# Patient Record
Sex: FEMALE | Race: BLACK OR AFRICAN AMERICAN | URBAN - METROPOLITAN AREA
[De-identification: names, ages, dates, MRNs, and addresses within clinical notes are randomized per-mention and may not be internally consistent; named-entity substitution may affect disease eponyms.]

---

## 2023-02-12 ENCOUNTER — OFFICE (OUTPATIENT)
Dept: URBAN - METROPOLITAN AREA CLINIC 90 | Facility: CLINIC | Age: 58
Setting detail: OPHTHALMOLOGY
End: 2023-02-12
Payer: COMMERCIAL

## 2023-02-12 DIAGNOSIS — H43.392: ICD-10-CM

## 2023-02-12 DIAGNOSIS — H40.013: ICD-10-CM

## 2023-02-12 DIAGNOSIS — H53.022: ICD-10-CM

## 2023-02-12 DIAGNOSIS — H25.13: ICD-10-CM

## 2023-02-12 DIAGNOSIS — H52.7: ICD-10-CM

## 2023-02-12 PROCEDURE — 92250 FUNDUS PHOTOGRAPHY W/I&R: CPT | Performed by: OPHTHALMOLOGY

## 2023-02-12 PROCEDURE — 92014 COMPRE OPH EXAM EST PT 1/>: CPT | Performed by: OPHTHALMOLOGY

## 2023-02-12 ASSESSMENT — CONFRONTATIONAL VISUAL FIELD TEST (CVF)
OD_FINDINGS: FULL
OS_FINDINGS: FULL

## 2023-02-12 ASSESSMENT — REFRACTION_MANIFEST
OD_CYLINDER: SPH
OD_VA1: 20/20
OD_SPHERE: +2.50
OD_SPHERE: +0.75
OD_VA2: 20/25(J1)
OD_VA1: 20/20
OS_SPHERE: +2.50
OD_ADD: +2.25
OD_VA2: 20/25(J1)

## 2023-02-12 ASSESSMENT — REFRACTION_AUTOREFRACTION
OD_CYLINDER: SPH
OS_AXIS: 042
OS_CYLINDER: -1.50
OD_SPHERE: +1.25
OS_SPHERE: -11.50

## 2023-02-12 ASSESSMENT — REFRACTION_CURRENTRX
OD_SPHERE: +2.00
OS_SPHERE: +2.00
OS_CYLINDER: SPH
OS_VPRISM_DIRECTION: SV
OD_SPHERE: +2.00
OS_OVR_VA: 20/
OD_VPRISM_DIRECTION: SV
OS_SPHERE: +2.00
OD_CYLINDER: SPH
OS_OVR_VA: 20/
OD_OVR_VA: 20/
OD_OVR_VA: 20/

## 2023-02-12 ASSESSMENT — VISUAL ACUITY
OD_BCVA: 20/400
OS_BCVA: 20/20-1

## 2023-02-12 ASSESSMENT — TONOMETRY
OS_IOP_MMHG: 14
OD_IOP_MMHG: 14

## 2023-02-12 ASSESSMENT — KERATOMETRY
OD_AXISANGLE_DEGREES: 088
OD_K1POWER_DIOPTERS: 42.25
OS_K2POWER_DIOPTERS: 43.25
OS_K1POWER_DIOPTERS: 42.50
OD_K2POWER_DIOPTERS: 42.50
OS_AXISANGLE_DEGREES: 114

## 2023-02-12 ASSESSMENT — SPHEQUIV_DERIVED: OS_SPHEQUIV: -12.25

## 2023-02-12 ASSESSMENT — AXIALLENGTH_DERIVED: OS_AL: 29.94

## 2024-01-02 ENCOUNTER — OFFICE (OUTPATIENT)
Dept: URBAN - METROPOLITAN AREA CLINIC 90 | Facility: CLINIC | Age: 59
Setting detail: OPHTHALMOLOGY
End: 2024-01-02
Payer: COMMERCIAL

## 2024-01-02 DIAGNOSIS — H43.811: ICD-10-CM

## 2024-01-02 DIAGNOSIS — H53.022: ICD-10-CM

## 2024-01-02 DIAGNOSIS — H40.013: ICD-10-CM

## 2024-01-02 DIAGNOSIS — H25.13: ICD-10-CM

## 2024-01-02 PROCEDURE — 92014 COMPRE OPH EXAM EST PT 1/>: CPT | Performed by: OPHTHALMOLOGY

## 2024-01-02 ASSESSMENT — REFRACTION_MANIFEST
OD_VA2: 20/25(J1)
OD_CYLINDER: SPH
OD_ADD: +2.25
OD_VA1: 20/20
OD_VA2: 20/25(J1)
OS_SPHERE: +2.50
OD_SPHERE: +0.75
OD_SPHERE: +2.50
OD_VA1: 20/20

## 2024-01-02 ASSESSMENT — REFRACTION_AUTOREFRACTION
OS_AXIS: 042
OS_CYLINDER: -1.50
OD_SPHERE: +1.25
OD_CYLINDER: SPH
OS_SPHERE: -11.50

## 2024-01-02 ASSESSMENT — REFRACTION_CURRENTRX
OD_SPHERE: +2.00
OD_CYLINDER: SPH
OS_OVR_VA: 20/
OD_OVR_VA: 20/
OD_VPRISM_DIRECTION: SV
OD_SPHERE: +2.00
OS_OVR_VA: 20/
OS_VPRISM_DIRECTION: SV
OS_SPHERE: +2.00
OD_OVR_VA: 20/
OS_CYLINDER: SPH
OS_SPHERE: +2.00

## 2024-01-02 ASSESSMENT — CONFRONTATIONAL VISUAL FIELD TEST (CVF)
OS_FINDINGS: FULL
OD_FINDINGS: FULL

## 2024-01-02 ASSESSMENT — SPHEQUIV_DERIVED: OS_SPHEQUIV: -12.25

## 2024-01-07 DIAGNOSIS — M25.561 PAIN IN RIGHT KNEE: ICD-10-CM

## 2024-01-07 PROBLEM — Z00.00 ENCOUNTER FOR PREVENTIVE HEALTH EXAMINATION: Status: ACTIVE | Noted: 2024-01-07

## 2024-01-10 ENCOUNTER — APPOINTMENT (OUTPATIENT)
Dept: ORTHOPEDIC SURGERY | Facility: CLINIC | Age: 59
End: 2024-01-10
Payer: COMMERCIAL

## 2024-01-10 ENCOUNTER — NON-APPOINTMENT (OUTPATIENT)
Age: 59
End: 2024-01-10

## 2024-01-10 VITALS — WEIGHT: 219 LBS | BODY MASS INDEX: 30.66 KG/M2 | HEIGHT: 71 IN

## 2024-01-10 DIAGNOSIS — M17.31 UNILATERAL POST-TRAUMATIC OSTEOARTHRITIS, RIGHT KNEE: ICD-10-CM

## 2024-01-10 PROCEDURE — 99203 OFFICE O/P NEW LOW 30 MIN: CPT | Mod: 25

## 2024-01-10 PROCEDURE — 73564 X-RAY EXAM KNEE 4 OR MORE: CPT | Mod: RT

## 2024-01-10 PROCEDURE — 20610 DRAIN/INJ JOINT/BURSA W/O US: CPT | Mod: RT

## 2024-01-10 NOTE — PHYSICAL EXAM
[de-identified] : General appearance: well-nourished and hydrated, pleasant, alert and oriented x 3, cooperative. HEENT: Normocephalic, EOM intact, Nasal septum midline, Oral cavity clear, External auditory canal clear. Cardiovascular: no apparent abnormalities, no lower leg edema, no varicosities, pedal pulses are palpable. Lymphatics Lymph nodes: none palpated, Lymphedema: not present. Neurologic: sensation is normal, no muscle weakness in upper or lower extremities, patella tendon reflexes intact. Dermatologic no apparent skin lesions, moist, warm, no rash. Spine: cervical spine appears normal and moves freely, thoracic spine appears normal and moves freely, lumbosacral spine appears normal and moves freely. Gait: nonantalgic.   Left knee Inspection: no effusion or erythema. Wounds: none. Alignment: normal. Palpation: no specific tenderness on palpation. ROM active (in degrees): 0-125 with crepitus. Ligamentous laxity: all ligaments appear stable, negative ant. drawer test, negative post. drawer test, stable to varus stress test, stable to valgus stress test. negative Lachman's test, negative pivot shift test Meniscal Test: negative McMurrays, negative Arlen. Patellofemoral Alignment Test: Q angle-, normal. Muscle Test: good quad strength. Leg examination: calf is soft and non-tender.   Right knee Inspection: no effusion, no errythmea.  Wounds: healed midline incision. Alignment: irregularity of the lateral patella. Palpation: no specific tenderness on palpation. ROM active (in degrees): 0-130 with patellofemoral crepitus. Ligamentous laxity: all ligaments appear stable, negative ant. drawer test, negative post. drawer test, stable to varus stress test, stable to valgus stress test. negative Lachman's test, negative pivot shift test Meniscal Test: negative McMurrays, negative Arlen. Patellofemoral Alignment Test: Q angle-, normal. Muscle Test: good quad strength. Leg examination: calf is soft and non-tender.   Left hip Inspection: No swelling or ecchymosis. Wounds: none. Palpation: non-tender. Stability: no instability. Strength: 5/5 all motor groups. ROM: no pain with FROM. Leg length: equal.   Right hip Inspection: No swelling or ecchymosis. Wounds: none. Palpation: non-tender. Stability: no instability. Strength: 5/5 all motor groups. ROM: no pain with FROM. Leg length: equal.   Left ankle Inspection: no erythema noted, no swelling noted. Palpation: no pain on palpation. ROM: FROM without crepitus. Muscle strength: 5/5. Stability: no instability noted.   Right ankle Inspection: no erythema noted, no swelling noted. ROM: FROM without crepitus. Palpation: no pain on palpation. Muscle strength: 5/5. Stability: no instability noted.   Left foot Inspection: color, texture and turgor are normal. ROM: full range of motion of all joints without pain or crepitus. Palpation: no tenderness. Stability: no instability noted.   Right foot Inspection: color, texture and turgor are normal. ROM: full range of motion of all joints without pain or crepitus. Palpation: no tenderness. Stability: no instability noted.   Left shoulder Inspection: no muscle asymmetry, no atrophy. Palpation: no tenderness noted, ACJ non-tender. ROM: full active ROM, full passive ROM. Strength testing): anterior deltoid, supraspinatus, infraspinatus, subscapularis all 5/5. Stability test: ant. apprehension negative, post. apprehension negative, relocation test negative. Impingement Test: negative NEER.   Right shoulder Inspection: no muscle asymmetry, no atrophy. Palpation: no tenderness noted, ACJ non-tender. ROM: full active ROM, full passive ROM. Strength testing): anterior deltoid, supraspinatus, infraspinatus, subscapularis all 5/5. Stability test: ant. apprehension negative, post. apprehension negative, relocation test negative. Impingement Test: negative NEER. Surgical Wounds: none.   Left elbow Inspection: negative swelling. Wounds: none. Palpation: non-tender. ROM: full ROM. Strength: 5/5 all groups. Stability: no instability. Mass: none.   Right elbow Inspection: negative swelling. Wounds: none. Palpation: non-tender. ROM: full ROM. Strength: 5/5 all groups. Stability: no instability. Mass: none.   Left wrist Inspection: negative swelling. Wound: none. Palpation (bone): no tenderness. ROM: full ROM. Strength: full , good.   Right wrist Inspection: negative swelling. Wound: none. Palpation (bone): no tenderness. ROM: full ROM. Strength: full , good.   Left hand Inspection: no skin changes, normal appearance. Wounds: none. Strength: full , able to make full fist. Sensation: light touch intact all fingers and thumb. Vascular: good capillary refill < 3 seconds, all fingers and thumb. Mass: none.   Right hand Inspection: no skin changes, normal appearance. Wounds: none. Strength: full , able to make full fist. Sensation: light touch intact all fingers and thumb. Vascular: good capillary refill < 3 seconds, all fingers and thumb. Mass: none. [de-identified] : Right knee x-rays, standing AP/Lateral and Merchant films, and 45-degree PA standing view, taken at the office today shows degenerative arthritis, lateral joint space narrowing, patella fragmentation of the superior pole, ossification of the distal quad tendon, joint space irregularity, sclerosis, patella at central position, patellofemoral joint space narrowing, Kellgren and Tu grade 2-3 with significant patellofemoral arthritis.   Left knee x-ray merchant view taken at the office today demonstrates joint space narrowing with irregularity, osteophytes, bone ossicle, severe patellofemoral arthritis and a well centered patella.

## 2024-01-10 NOTE — DISCUSSION/SUMMARY
[de-identified] : Discussed at length the nature of the patient's condition. Patient's right knee symptoms are secondary to post-traumatic arthritis. She has significant patellofemoral arthritis and degenerative arthritis especially of the lateral compartment. The patient has had a prior quad tendon repair procedure done. While I believe she would eventually benefit from a right TKR, surgery is not warranted at this time. Therefore, we will continue nonoperatively. Patient was given a right knee cortisone injection as detailed above for symptomatic relief. We will seek authorization for right knee viscosupplementation injections. Once we have authorization, we will proceed accordingly. I also gave the patient a script for PT.   Patient can continue home exercises, Advil, weight management and activities as tolerated. All questions were answered, understanding verbalized. Patient is in agreement with plan of treatment.

## 2024-01-10 NOTE — ADDENDUM
[FreeTextEntry1] : This note was written by Adriana Clifford on 01/10/2024 acting as scribe for Dr. Aaron OGDEN I, Dr. Aaron Melendez, have read and attest that all the information, medical decision making and discharge instructions within are true and accurate.   This note was written by Miguel Ángel Longoria on 01/10/2024 acting as scribe for Dr. Aaron OGDEN I, Dr. Aaron Melendez, have read and attest that all the information, medical decision making and discharge instructions within are true and accurate.

## 2024-01-10 NOTE — HISTORY OF PRESENT ILLNESS
[de-identified] : STEPHANIE MARTINEZ  58 year old female presents for initial evaluation of right knee pain. In 2012 she had a quadricep rupture which resulted in surgery and returned to all activities as normal after recovery. She notes in 2021 she began having lateral knee pain with some catching. She states the pain is dull and achy with strenuous activity. The patient denies any swelling, buckling, locking or LOM. She does note painless clicking. She is doing leg strengthening exercises which she uses Advil, a heating pad and icing for her knee. She can walk and navigate stairs without any issues. She also denies the use of any assistive devices.

## 2024-01-10 NOTE — PROCEDURE
[de-identified] : RIGHT KNEE CORTISONE INJECTION Discussed at length with the patient the planned steroid and lidocaine injection. The risks, benefits, convalescence and alternatives were reviewed. The possible side effects discussed included but were not limited to: pain, swelling, heat and redness. These symptoms are generally mild but if they are extensive then contact the office. Giving pain relievers by mouth such as NSAIDs or Tylenol can generally treat the reactions to steroid and lidocaine. Rare cases of infection have been noted. Rash, hives and itching may occur post injection. If you have muscle pain or cramps, flushing and or swelling of the face, rapid heart beat, nausea, dizziness, fever, chills, headache, difficulty breathing, swelling in the arms or legs, or have a prickly feeling of your skin, contact a health care provider immediately.  Following this discussion, the knee was prepped with betadine and under sterile conditions 5 cc of 2% lidocaine and 1 cc depo-medrol (40mg) were injected with a 21 gauge needle. The needle was introduced into the joint, aspiration was performed to ensure intra-articular placement and the medication was injected. Upon withdrawal of the needle the site was cleaned with alcohol and a bandaid applied. The patient tolerated the injection well and there were no adverse effects. Post injection instructions included no strenuous activity for 24 hours, cryotherapy and if there are any adverse effects to contact the office.

## 2024-01-15 ENCOUNTER — OFFICE (OUTPATIENT)
Dept: URBAN - METROPOLITAN AREA CLINIC 90 | Facility: CLINIC | Age: 59
Setting detail: OPHTHALMOLOGY
End: 2024-01-15
Payer: COMMERCIAL

## 2024-01-15 DIAGNOSIS — H43.811: ICD-10-CM

## 2024-01-15 DIAGNOSIS — H43.392: ICD-10-CM

## 2024-01-15 DIAGNOSIS — H40.013: ICD-10-CM

## 2024-01-15 DIAGNOSIS — H53.022: ICD-10-CM

## 2024-01-15 DIAGNOSIS — H25.13: ICD-10-CM

## 2024-01-15 PROCEDURE — 92014 COMPRE OPH EXAM EST PT 1/>: CPT | Performed by: OPHTHALMOLOGY

## 2024-01-15 ASSESSMENT — REFRACTION_CURRENTRX
OD_SPHERE: +2.00
OS_OVR_VA: 20/
OS_SPHERE: +2.00
OD_VPRISM_DIRECTION: SV
OD_OVR_VA: 20/
OD_SPHERE: +2.00
OS_OVR_VA: 20/
OD_CYLINDER: SPH
OS_CYLINDER: SPH
OD_OVR_VA: 20/
OS_SPHERE: +2.00
OS_VPRISM_DIRECTION: SV

## 2024-01-15 ASSESSMENT — REFRACTION_MANIFEST
OD_CYLINDER: SPH
OD_VA1: 20/20
OD_VA2: 20/25(J1)
OD_SPHERE: +0.75
OD_VA2: 20/25(J1)
OS_SPHERE: +2.50
OD_VA1: 20/20
OD_ADD: +2.25
OD_SPHERE: +2.50

## 2024-01-15 ASSESSMENT — CONFRONTATIONAL VISUAL FIELD TEST (CVF)
OD_FINDINGS: FULL
OS_FINDINGS: FULL

## 2024-01-15 ASSESSMENT — REFRACTION_AUTOREFRACTION
OS_CYLINDER: -1.50
OD_SPHERE: +1.25
OD_CYLINDER: SPH
OS_SPHERE: -11.50
OS_AXIS: 042

## 2024-01-15 ASSESSMENT — SPHEQUIV_DERIVED: OS_SPHEQUIV: -12.25

## 2024-07-29 ENCOUNTER — OFFICE (OUTPATIENT)
Dept: URBAN - METROPOLITAN AREA CLINIC 90 | Facility: CLINIC | Age: 59
Setting detail: OPHTHALMOLOGY
End: 2024-07-29
Payer: COMMERCIAL

## 2024-07-29 DIAGNOSIS — H43.392: ICD-10-CM

## 2024-07-29 DIAGNOSIS — H40.013: ICD-10-CM

## 2024-07-29 DIAGNOSIS — H11.133: ICD-10-CM

## 2024-07-29 DIAGNOSIS — H53.022: ICD-10-CM

## 2024-07-29 DIAGNOSIS — H25.13: ICD-10-CM

## 2024-07-29 DIAGNOSIS — H43.811: ICD-10-CM

## 2024-07-29 PROCEDURE — 92014 COMPRE OPH EXAM EST PT 1/>: CPT | Performed by: OPHTHALMOLOGY

## 2024-07-29 PROCEDURE — 92133 CPTRZD OPH DX IMG PST SGM ON: CPT | Performed by: OPHTHALMOLOGY

## 2024-07-29 ASSESSMENT — CONFRONTATIONAL VISUAL FIELD TEST (CVF)
OS_FINDINGS: FULL
OD_FINDINGS: FULL

## 2025-03-03 ENCOUNTER — RX ONLY (RX ONLY)
Age: 60
End: 2025-03-03

## 2025-03-03 ENCOUNTER — OFFICE (OUTPATIENT)
Facility: LOCATION | Age: 60
Setting detail: OPHTHALMOLOGY
End: 2025-03-03
Payer: COMMERCIAL

## 2025-03-03 DIAGNOSIS — H25.13: ICD-10-CM

## 2025-03-03 DIAGNOSIS — H11.133: ICD-10-CM

## 2025-03-03 DIAGNOSIS — H43.812: ICD-10-CM

## 2025-03-03 DIAGNOSIS — H43.392: ICD-10-CM

## 2025-03-03 DIAGNOSIS — H53.022: ICD-10-CM

## 2025-03-03 DIAGNOSIS — H52.03: ICD-10-CM

## 2025-03-03 DIAGNOSIS — H40.013: ICD-10-CM

## 2025-03-03 DIAGNOSIS — H43.811: ICD-10-CM

## 2025-03-03 PROCEDURE — 92014 COMPRE OPH EXAM EST PT 1/>: CPT | Performed by: STUDENT IN AN ORGANIZED HEALTH CARE EDUCATION/TRAINING PROGRAM

## 2025-03-03 PROCEDURE — 92250 FUNDUS PHOTOGRAPHY W/I&R: CPT | Performed by: STUDENT IN AN ORGANIZED HEALTH CARE EDUCATION/TRAINING PROGRAM

## 2025-03-03 PROCEDURE — 92015 DETERMINE REFRACTIVE STATE: CPT | Performed by: STUDENT IN AN ORGANIZED HEALTH CARE EDUCATION/TRAINING PROGRAM

## 2025-03-03 ASSESSMENT — KERATOMETRY
OS_AXISANGLE_DEGREES: 129
OD_AXISANGLE_DEGREES: 090
METHOD_AUTO_MANUAL: AUTO
OD_K1POWER_DIOPTERS: 41.75
OD_K2POWER_DIOPTERS: 41.75
OS_K2POWER_DIOPTERS: 43.00
OS_K1POWER_DIOPTERS: 42.00

## 2025-03-03 ASSESSMENT — REFRACTION_CURRENTRX
OS_VPRISM_DIRECTION: SV
OD_CYLINDER: SPH
OS_SPHERE: +2.50
OS_OVR_VA: 20/
OS_CYLINDER: SPHERE
OS_SPHERE: +2.00
OD_OVR_VA: 20/
OD_AXIS: 180
OS_SPHERE: +2.00
OD_SPHERE: +2.00
OD_SPHERE: +1.00
OD_AXIS: 180
OD_OVR_VA: 20/
OS_OVR_VA: 20/
OS_VPRISM_DIRECTION: SV
OD_CYLINDER: SPHERE
OS_VPRISM_DIRECTION: SV
OD_SPHERE: +2.00
OD_VPRISM_DIRECTION: SV
OS_CYLINDER: SPHERE
OS_OVR_VA: 20/
OD_CYLINDER: SPHERE
OS_AXIS: 180
OS_SPHERE: +1.00
OD_VPRISM_DIRECTION: SV
OS_AXIS: 180
OD_SPHERE: +2.50
OD_VPRISM_DIRECTION: SV
OS_CYLINDER: SPH
OD_OVR_VA: 20/

## 2025-03-03 ASSESSMENT — REFRACTION_MANIFEST
OS_SPHERE: +1.00
OD_CYLINDER: SPH
OD_VA1: 20/20
OS_CYLINDER: SPH
OD_SPHERE: +2.50
OS_SPHERE: +2.50
OD_SPHERE: +1.50
OD_VA2: 20/25(J1)
OD_VA2: 20/25(J1)
OD_CYLINDER: SPH
OD_VA1: 20/20
OS_CYLINDER: BALANCE
OD_SPHERE: +1.00
OD_ADD: +2.25
OD_SPHERE: +0.75
OS_SPHERE: BALANCE
OD_CYLINDER: SPH
OS_AXIS: BAL
OD_VA2: 20/25(J1)
OD_VA1: 20/20
OD_AXIS: 000

## 2025-03-03 ASSESSMENT — VISUAL ACUITY
OS_BCVA: 20/20
OD_BCVA: 20/630

## 2025-03-03 ASSESSMENT — REFRACTION_AUTOREFRACTION
OS_AXIS: 046
OD_AXIS: 0
OS_SPHERE: -11.75
OS_CYLINDER: -1.25
OD_CYLINDER: 0.00
OD_SPHERE: +1.50

## 2025-03-03 ASSESSMENT — TONOMETRY
OD_IOP_MMHG: 16
OS_IOP_MMHG: 17

## 2025-03-03 ASSESSMENT — CONFRONTATIONAL VISUAL FIELD TEST (CVF)
OS_FINDINGS: FULL
OD_FINDINGS: FULL